# Patient Record
Sex: FEMALE | Race: WHITE | NOT HISPANIC OR LATINO | Employment: PART TIME | URBAN - METROPOLITAN AREA
[De-identification: names, ages, dates, MRNs, and addresses within clinical notes are randomized per-mention and may not be internally consistent; named-entity substitution may affect disease eponyms.]

---

## 2024-01-24 NOTE — PROGRESS NOTES
Lashell Bales female   1970 53 y.o.   [id number]      Chief Complaint  High risk surveillance care, annual mammogram and exam    History Of Present Illness  Lashell Bales is a very pleasant 53 year old  woman [facility] and exam for high risk surveillance care. She has a history of left breast cyst aspiration in 2021. 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 She has family history of breast cancer in four maternal aunts and four maternal cousins, one cousin to her knowledge is BRCA positive. All were diagnosed between ages 30s-50s. She denies breast surgery or biopsy.      Lashell reports a personal history of intracranial hypertension in  that resulted in two shunt placements. However, she states her body rejected the shunt and she no longer requires follow up with the neurologist.      She presents today for annual mammogram and exam. She denies any new masses or lumps. She started Tamoxifen 10mg daily for three years in 2023. She took for 3-4 months and discontinued due to GI issues. She had COVID twice and the GI issues did not resolve following discontinuance. She is following up with a GI specialist and would like to restart Tamoxifen.      BREAST IMAGIN2023 Bilateral screening mammogram, indicates BI-RADS Category 2. 2022 FAST breast MRI, indicates BI-RADS Category 2.     FEMALE HISTORY: menarche age 16, , first birth age 29,  x8 months, no OCP's, perimenopausal, LMP 2024, reports regular cycles, reports mild night sweats, heterogeneously dense tissue     FAMILY CANCER HISTORY:  Maternal Aunt (1): Breast cancer/Lung cancer  Maternal Aunt (2): Breast cancer  Maternal Aunt (3): Breast cancer  Maternal Aunt (4): Breast cancer  Maternal Cousin (1): Breast cancer, BRCA positive  Maternal  Cousin (2): Breast cancer  Maternal Cousin (3): Breast cancer  Maternal Cousin (4): Breast cancer  Paternal Cousin: Cervical cancer      Surgical History  She has a past surgical history that includes  section, classic (2015); Hand surgery (2015); Other surgical history (2020); Other surgical history (2020); Other surgical history (2016); Other surgical history (2016); Dilation and curettage of uterus (2020); MR angio head wo IV contrast (2015); and MR head angio w IV contrast (2016).     Social History  She reports that she has never smoked. She has never used smokeless tobacco. No history on file for alcohol use and drug use.    Family History  Family History   Problem Relation Name Age of Onset    Breast cancer Mother's Sister      Breast cancer Father's Sister          Allergies  Sulfa (sulfonamide antibiotics) and Morphine    Medications  No current outpatient medications      REVIEW OF SYSTEMS    Constitutional:  Negative for appetite change, fatigue, fever and unexpected weight change.   HENT:  Negative for ear pain, hearing loss, nosebleeds, sore throat and trouble swallowing.    Eyes:  Negative for discharge, itching and visual disturbance.   Respiratory:  Negative for cough, chest tightness and shortness of breath.    Cardiovascular:  Negative for chest pain, palpitations and leg swelling.   Breast: as indicated in HPI  Gastrointestinal:  Negative for abdominal pain, constipation, diarrhea and nausea.   Endocrine: Negative for cold intolerance and heat intolerance.   Genitourinary:  Negative for dysuria, frequency, hematuria, pelvic pain and vaginal bleeding.   Musculoskeletal:  Negative for arthralgias, back pain, gait problem, joint swelling and myalgias.   Skin:  Negative for color change and rash.   Allergic/Immunologic: Negative for environmental allergies and food allergies.   Neurological:  Negative for dizziness, tremors, speech difficulty,  weakness, numbness and headaches.   Hematological:  Does not bruise/bleed easily.   Psychiatric/Behavioral:  Negative for agitation, dysphoric mood and sleep disturbance. The patient is not nervous/anxious.         Past Medical History  She has a past medical history of Personal history of other infectious and parasitic diseases (01/28/2016).     Physical Exam  Patient is alert and oriented x3 and in a relaxed and appropriate mood. Her gait is steady and hand grasps are equal. Sclera is clear. The breasts are nearly symmetrical. The tissue is very dense and grainy in the superior lateral quadrants and softer below without palpable abnormalities, discrete nodules or masses. There is a well-healed skin excision upper chest wall. The skin and nipples appear normal. There is no cervical, supraclavicular or axillary lymphadenopathy. Heart rate and rhythm normal, S1 and S2 appreciated. The lungs are clear to auscultation bilaterally. Abdomen is soft and non-tender.       Physical Exam     Last Recorded Vitals  Vitals:    02/01/24 0754   BP: 142/89   Pulse: 67       Relevant Results   Time was spent viewing digital images of the radiology testing with the patient. I explained the results in depth, along with suggested explanation for follow up recommendations based on the testing results. BI-RADS Category 2    Imaging  Narrative & Impression   Interpreted By:  Melissa Rangel,   STUDY:  BI MAMMO BILATERAL SCREENING TOMOSYNTHESIS;  2/1/2024 7:57 am      ACCESSION NUMBER(S):  [id number]      ORDERING CLINICIAN:  RENNY GUERIN      INDICATION:  Screening.      COMPARISON:  All prior mammograms that are available at the time of interpretation.      FINDINGS:  2D and tomosynthesis images were reviewed at 1 mm slice thickness.      Density:  The breast tissue is heterogeneously dense, which may  obscure small masses.      There are bilateral benign circumscribed masses fluctuating in size  and number over time,  consistent with a changing cystic pattern. No  suspicious masses or calcifications are identified.      IMPRESSION:  No mammographic evidence of malignancy.      BI-RADS CATEGORY:  BI-RADS Category:  2 Benign.  Recommendation:  Routine Screening Mammogram in 1 Year.  Recommended Date:  1 Year.  Laterality:  Bilateral.       Assessment/Plan   High risk surveillance care, normal clinical exam and imaging, family history of breast cancer, family history of BRCA gene, BRCA negative, history of left cyst aspiration, extremely dense tissue     Plan: Return in one year for bilateral screening mammogram and office visit. Restart Tamoxifen 10mg daily. Refills sent x 1 year. Full MRI in July/August 2024.     Patient Discussion/Summary  Your clinical examination and imaging are normal. Please return in one year for bilateral screening mammogram and office visit or sooner if you have any problems or concerns.    High risk breast surveillance care plan:  Yearly mammogram with digital breast tomosynthesis  Twice yearly clinical breast examinations  Breast MRI - Full MRI in July/August 2024  Monthly self breast examinations  Vitamin D3 2000 IU/daily (over the counter)  Exercise 3-4 times per week for 45-60 minutes  Limit alcohol to 3-4 drinks per week   Eat a healthy low-fat diet with lots of fruits and vegetables  Risk models indicate personal risk of breast cancer in the next five years and lifetime (age 90)  Breast Cancer Risk Assessment Tool (Yodit): 5 year risk 1.1% (average 1.4%) and lifetime risk 8.8% (average 10.8%)  Alida: 5 year risk 2.8% (average 1.3%) and lifetime risk 18.4% (average 9%)   Restart Tamoxifen 10mg daily.    You can see your health information, review clinical summaries from office visits & test results online when you follow your health with MY  Chart, a personal health record. To sign up go to [email or web address]. If you need assistance with signing up or trouble getting into your  account call [facility] Patient Line 24/7 at [phone].    My office phone number is [phone] if you need to get in touch with me or have additional questions or concerns. Thank you for choosing [facility] and trusting me as your healthcare provider. I look forward to seeing you again at your next office visit. I am honored to be a provider on your health care team and I remain dedicated to helping you achieve your health goals.      Zaria Lima, APRN-CNP

## 2024-02-01 ENCOUNTER — HOSPITAL ENCOUNTER (OUTPATIENT)
Dept: RADIOLOGY | Facility: CLINIC | Age: 54
Discharge: HOME | End: 2024-02-01
Payer: COMMERCIAL

## 2024-02-01 ENCOUNTER — OFFICE VISIT (OUTPATIENT)
Dept: SURGICAL ONCOLOGY | Facility: CLINIC | Age: 54
End: 2024-02-01
Payer: COMMERCIAL

## 2024-02-01 VITALS
BODY MASS INDEX: 29.62 KG/M2 | SYSTOLIC BLOOD PRESSURE: 142 MMHG | WEIGHT: 154.2 LBS | DIASTOLIC BLOOD PRESSURE: 89 MMHG | HEART RATE: 67 BPM

## 2024-02-01 DIAGNOSIS — Z12.39 BREAST CANCER SCREENING, HIGH RISK PATIENT: Primary | ICD-10-CM

## 2024-02-01 DIAGNOSIS — Z12.31 ENCOUNTER FOR SCREENING MAMMOGRAM FOR MALIGNANT NEOPLASM OF BREAST: ICD-10-CM

## 2024-02-01 DIAGNOSIS — Z79.810 USE OF TAMOXIFEN (NOLVADEX): ICD-10-CM

## 2024-02-01 DIAGNOSIS — R92.30 DENSE BREAST TISSUE: ICD-10-CM

## 2024-02-01 PROCEDURE — 99214 OFFICE O/P EST MOD 30 MIN: CPT | Performed by: NURSE PRACTITIONER

## 2024-02-01 PROCEDURE — 77063 BREAST TOMOSYNTHESIS BI: CPT

## 2024-02-01 PROCEDURE — 77063 BREAST TOMOSYNTHESIS BI: CPT | Mod: BILATERAL PROCEDURE | Performed by: RADIOLOGY

## 2024-02-01 PROCEDURE — 77067 SCR MAMMO BI INCL CAD: CPT | Mod: BILATERAL PROCEDURE | Performed by: RADIOLOGY

## 2024-02-01 RX ORDER — TAMOXIFEN CITRATE 10 MG/1
10 TABLET ORAL DAILY
Qty: 90 TABLET | Refills: 3 | Status: SHIPPED | OUTPATIENT
Start: 2024-02-01 | End: 2025-01-31

## 2024-02-01 ASSESSMENT — PAIN SCALES - GENERAL: PAINLEVEL: 0-NO PAIN

## 2024-02-01 NOTE — PATIENT INSTRUCTIONS
Your clinical examination and imaging are normal. Please return in one year for bilateral screening mammogram and office visit or sooner if you have any problems or concerns.    High risk breast surveillance care plan:  Yearly mammogram with digital breast tomosynthesis  Twice yearly clinical breast examinations  Breast MRI - Full MRI in July/August 2024  Monthly self breast examinations  Vitamin D3 2000 IU/daily (over the counter)  Exercise 3-4 times per week for 45-60 minutes  Limit alcohol to 3-4 drinks per week   Eat a healthy low-fat diet with lots of fruits and vegetables  Risk models indicate personal risk of breast cancer in the next five years and lifetime (age 90)  Breast Cancer Risk Assessment Tool (Yodit): 5 year risk 1.1% (average 1.4%) and lifetime risk 8.8% (average 10.8%)  Alida: 5 year risk 2.8% (average 1.3%) and lifetime risk 18.4% (average 9%)   Restart Tamoxifen 10mg daily.    You can see your health information, review clinical summaries from office visits & test results online when you follow your health with MY  Chart, a personal health record. To sign up go to [email or web address]. If you need assistance with signing up or trouble getting into your account call [employer] Patient Line 24/7 at [phone].    My office phone number is [phone] if you need to get in touch with me or have additional questions or concerns. Thank you for choosing [facility] and trusting me as your healthcare provider. I look forward to seeing you again at your next office visit. I am honored to be a provider on your health care team and I remain dedicated to helping you achieve your health goals.

## 2024-06-03 ENCOUNTER — PATIENT MESSAGE (OUTPATIENT)
Dept: SURGICAL ONCOLOGY | Facility: CLINIC | Age: 54
End: 2024-06-03
Payer: COMMERCIAL

## 2024-06-04 NOTE — TELEPHONE ENCOUNTER
From: Lashell Bales  To: Zaria Lima  Sent: 6/3/2024 4:48 PM EDT  Subject: Lump in left breast    Good afternoon. I have a lump in my left breast that has grown pretty quickly in just a few weeks. It feels like the one I had drained last time. I didn’t know if I should make an appointment with you or the physician that drained the last one.

## 2024-06-05 NOTE — PROGRESS NOTES
Lashell Bales female   1970 53 y.o.   [id number]      Chief Complaint  High risk surveillance care, left breast mass    History Of Present Illness  Lashell Bales is a very pleasant 53 year old  woman [facility] and exam for high risk surveillance care. She has a history of left breast cyst aspiration in 2021. 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 She has family history of breast cancer in four maternal aunts and four maternal cousins, one cousin to her knowledge is BRCA positive. All were diagnosed between ages 30s-50s. She denies breast surgery or biopsy.      Lashell reports a personal history of intracranial hypertension in  that resulted in two shunt placements. However, she states her body rejected the shunt and she no longer requires follow up with the neurologist.     She started Tamoxifen 10mg daily for three years in 2023. She took for 3-4 months and discontinued due to GI issues. She had COVID twice and the GI issues did not resolve following discontinuance. She restarted Tamoxifen in 2024, currently taking and tolerating well.      She presents today for left breast mass. She first noticed the mass about one month ago. She states it is a little bothersome and has increased in size since first noticing it. She states it fluctuates with her cycle.      BREAST IMAGIN2023 Bilateral screening mammogram, indicates BI-RADS Category 2. 2022 FAST breast MRI, indicates BI-RADS Category 2.     FEMALE HISTORY: menarche age 16, , first birth age 29,  x8 months, no OCP's, perimenopausal, LMP 2024, reports regular cycles, reports mild night sweats, heterogeneously dense tissue     FAMILY CANCER HISTORY:  Maternal Aunt (1): Breast cancer/Lung cancer  Maternal Aunt (2): Breast  cancer  Maternal Aunt (3): Breast cancer  Maternal Aunt (4): Breast cancer  Maternal Cousin (1): Breast cancer, BRCA positive  Maternal Cousin (2): Breast cancer  Maternal Cousin (3): Breast cancer  Maternal Cousin (4): Breast cancer  Paternal Cousin: Cervical cancer      Surgical History  She has a past surgical history that includes  section, classic (2015); Hand surgery (2015); Other surgical history (2020); Other surgical history (2020); Other surgical history (2016); Other surgical history (2016); Dilation and curettage of uterus (2020); MR angio head wo IV contrast (2015); MR head angio w IV contrast (2016); US breast core biopsy (Left); and BI US guided cyst aspiration (Left).     Social History  She reports that she has never smoked. She has never used smokeless tobacco. No history on file for alcohol use and drug use.    Family History  Family History   Problem Relation Name Age of Onset    Breast cancer Mother's Sister      Breast cancer Father's Sister      Breast cancer Cousin          Allergies  Sulfa (sulfonamide antibiotics) and Morphine    Medications  Current Outpatient Medications   Medication Instructions    tamoxifen (NOLVADEX) 10 mg, oral, Daily, Take with water or any other nonalcoholic drink with or without food at around the same time(s) every day.         REVIEW OF SYSTEMS    Constitutional:  Negative for appetite change, fatigue, fever and unexpected weight change.   HENT:  Negative for ear pain, hearing loss, nosebleeds, sore throat and trouble swallowing.    Eyes:  Negative for discharge, itching and visual disturbance.   Respiratory:  Negative for cough, chest tightness and shortness of breath.    Cardiovascular:  Negative for chest pain, palpitations and leg swelling.   Breast: as indicated in HPI  Gastrointestinal:  Negative for abdominal pain, constipation, diarrhea and nausea.   Endocrine: Negative for cold intolerance and  heat intolerance.   Genitourinary:  Negative for dysuria, frequency, hematuria, pelvic pain and vaginal bleeding.   Musculoskeletal:  Negative for arthralgias, back pain, gait problem, joint swelling and myalgias.   Skin:  Negative for color change and rash.   Allergic/Immunologic: Negative for environmental allergies and food allergies.   Neurological:  Negative for dizziness, tremors, speech difficulty, weakness, numbness and headaches.   Hematological:  Does not bruise/bleed easily.   Psychiatric/Behavioral:  Negative for agitation, dysphoric mood and sleep disturbance. The patient is not nervous/anxious.         Past Medical History  She has a past medical history of Personal history of other infectious and parasitic diseases (01/28/2016).     Physical Exam  Patient is alert and oriented x3 and in a relaxed and appropriate mood. Her gait is steady and hand grasps are equal. Sclera is clear. The breasts are nearly symmetrical. The tissue is very dense and grainy in the superior lateral quadrants and softer below without palpable abnormalities, discrete nodules or masses. The left breast, 2:30, 9 cm from the nipple, 3 x 3 cm soft, round and mobile cyst. There is a well-healed skin excision upper chest wall. The skin and nipples appear normal. There is no cervical, supraclavicular or axillary lymphadenopathy. Heart rate and rhythm normal, S1 and S2 appreciated. The lungs are clear to auscultation bilaterally. Abdomen is soft and non-tender.         Incision & Drainage    Date/Time: 6/19/2024 10:37 AM    Performed by: GLYNN Barbosa  Authorized by: GLYNN Barbosa    Consent:     Consent obtained:  Verbal    Consent given by:  Patient    Risks, benefits, and alternatives were discussed: yes      Risks discussed:  Bleeding and infection    Alternatives discussed:  No treatment  Universal protocol:     Procedure explained and questions answered to patient or proxy's satisfaction: yes       Relevant documents present and verified: yes      Test results available : yes      Imaging studies available: yes      Required blood products, implants, devices, and special equipment available: no      Site/side marked: yes      Immediately prior to procedure, a time out was called: yes      Patient identity confirmed:  Verbally with patient and hospital-assigned identification number  Location:     Type:  Cyst    Size:  3 x 3 cm    Location:  Trunk    Trunk location:  L breast  Pre-procedure details:     Skin preparation:  Chlorhexidine  Sedation:     Sedation type:  None  Anesthesia:     Anesthesia method:  Local infiltration    Local anesthetic:  Lidocaine 1% w/o epi  Procedure type:     Complexity:  Simple  Procedure details:     Ultrasound guidance: no      Needle aspiration: yes      Needle size:  18 G    Incision types:  Single straight    Incision depth:  Subcutaneous    Drainage:  Serosanguinous    Drainage amount:  Moderate    Packing materials:  None  Post-procedure details:     Procedure completion:  Tolerated        Physical Exam  Chest:              Last Recorded Vitals  Vitals:    06/19/24 0804   BP: 118/82   Pulse: 70   Resp: 16         Relevant Results   Time was spent discussing digital images of the radiology testing with the patient. I explained the results in depth, along with suggested explanation for follow up recommendations based on the testing results. BI-RADS Category 2    Imaging    Narrative & Impression   Interpreted By:  Torito Hernandez and Avery Ross   STUDY:  BI US BREAST LIMITED LEFT;  6/19/2024 8:44 am      ACCESSION NUMBER(S):  [id number]      ORDERING CLINICIAN:  RENNY GUERIN      INDICATION:  53-year-old female with the a tender palpable left breast lump for 2  weeks.      COMPARISON:  02/01/2024, 01/30/2023, 01/26/2022.      FINDINGS:  A targeted ultrasound in the area of the palpable left breast lump  was performed by a registered sonographer using elastography.       An oval circumscribed parallel anechoic cyst with posterior  enhancement is seen in the area of the patient's palpable lump at the  2:30 o'clock position 9 cm from the nipple. The cyst measures 2.8 x  2.1 x 2.8 cm. It is avascular and soft on elastography.      IMPRESSION:  A benign simple cyst corresponds with the patient's palpable left  breast lump. Clinical follow-up and continued annual screening with a  bilateral mammogram in February 2025 is recommended.      BI-RADS CATEGORY:      BI-RADS Category:  2 Benign.  Recommendation:  Clinical Follow-up and Continued Annual Screening.  Recommended Date:  8 Months.  Laterality:  Bilateral.     Narrative & Impression   Interpreted By:  Melissa Rangel,   STUDY:  BI MAMMO BILATERAL SCREENING TOMOSYNTHESIS;  2/1/2024 7:57 am      ACCESSION NUMBER(S):  [id number]      ORDERING CLINICIAN:  RENNY GUERIN      INDICATION:  Screening.      COMPARISON:  All prior mammograms that are available at the time of interpretation.      FINDINGS:  2D and tomosynthesis images were reviewed at 1 mm slice thickness.      Density:  The breast tissue is heterogeneously dense, which may  obscure small masses.      There are bilateral benign circumscribed masses fluctuating in size  and number over time, consistent with a changing cystic pattern. No  suspicious masses or calcifications are identified.      IMPRESSION:  No mammographic evidence of malignancy.      BI-RADS CATEGORY:  BI-RADS Category:  2 Benign.  Recommendation:  Routine Screening Mammogram in 1 Year.  Recommended Date:  1 Year.  Laterality:  Bilateral.       Assessment/Plan   High risk surveillance care, normal clinical exam and imaging, left breast benign cyst, family history of breast cancer, family history of BRCA gene, BRCA negative, extremely dense tissue     Plan: Left breast cyst aspirated. Return in February 2025 for bilateral screening mammogram and office visit. Continue Tamoxifen 10mg daily. Full MRI in  August 2024 as scheduled.     Patient Discussion/Summary  Your clinical examination and imaging are normal. You have a left breast benign cyst that we aspirated. Please return in February 2025 for bilateral screening mammogram and office visit or sooner if you have any problems or concerns. Continue Tamoxifen 10mg daily.    You can see your health information, review clinical summaries from office visits & test results online when you follow your health with MY  Chart, a personal health record. To sign up go to [email or web address]. If you need assistance with signing up or trouble getting into your account call [employer] Patient Line 24/7 at [phone].    My office phone number is [phone] if you need to get in touch with me or have additional questions or concerns. Thank you for choosing [facility] and trusting me as your healthcare provider. I look forward to seeing you again at your next office visit. I am honored to be a provider on your health care team and I remain dedicated to helping you achieve your health goals.      Zaria Lima, APRN-CNP

## 2024-06-18 ENCOUNTER — APPOINTMENT (OUTPATIENT)
Dept: RADIOLOGY | Facility: HOSPITAL | Age: 54
End: 2024-06-18
Payer: COMMERCIAL

## 2024-06-18 ENCOUNTER — APPOINTMENT (OUTPATIENT)
Dept: SURGICAL ONCOLOGY | Facility: HOSPITAL | Age: 54
End: 2024-06-18
Payer: COMMERCIAL

## 2024-06-19 ENCOUNTER — OFFICE VISIT (OUTPATIENT)
Dept: SURGICAL ONCOLOGY | Facility: HOSPITAL | Age: 54
End: 2024-06-19
Payer: COMMERCIAL

## 2024-06-19 ENCOUNTER — HOSPITAL ENCOUNTER (OUTPATIENT)
Dept: RADIOLOGY | Facility: HOSPITAL | Age: 54
Discharge: HOME | End: 2024-06-19
Payer: COMMERCIAL

## 2024-06-19 ENCOUNTER — APPOINTMENT (OUTPATIENT)
Dept: SURGICAL ONCOLOGY | Facility: HOSPITAL | Age: 54
End: 2024-06-19
Payer: COMMERCIAL

## 2024-06-19 VITALS
BODY MASS INDEX: 27.6 KG/M2 | RESPIRATION RATE: 16 BRPM | HEIGHT: 62 IN | HEART RATE: 70 BPM | WEIGHT: 150 LBS | SYSTOLIC BLOOD PRESSURE: 118 MMHG | DIASTOLIC BLOOD PRESSURE: 82 MMHG

## 2024-06-19 DIAGNOSIS — N63.0 LUMP IN FEMALE BREAST: ICD-10-CM

## 2024-06-19 DIAGNOSIS — N60.02 BREAST CYST, LEFT: Primary | ICD-10-CM

## 2024-06-19 PROCEDURE — 10060 I&D ABSCESS SIMPLE/SINGLE: CPT | Performed by: NURSE PRACTITIONER

## 2024-06-19 PROCEDURE — 76642 ULTRASOUND BREAST LIMITED: CPT | Mod: LEFT SIDE | Performed by: RADIOLOGY

## 2024-06-19 PROCEDURE — 76982 USE 1ST TARGET LESION: CPT | Mod: LT

## 2024-06-19 PROCEDURE — 76642 ULTRASOUND BREAST LIMITED: CPT | Mod: LT

## 2024-06-19 PROCEDURE — 99213 OFFICE O/P EST LOW 20 MIN: CPT | Mod: 25 | Performed by: NURSE PRACTITIONER

## 2024-06-19 NOTE — PATIENT INSTRUCTIONS
Your clinical examination and imaging are normal. You have a left breast benign cyst that we aspirated. Please return in February 2025 for bilateral screening mammogram and office visit or sooner if you have any problems or concerns. Continue Tamoxifen 10mg daily.    You can see your health information, review clinical summaries from office visits & test results online when you follow your health with MY  Chart, a personal health record. To sign up go to [email or web address]. If you need assistance with signing up or trouble getting into your account call [employer] Patient Line 24/7 at [phone].    My office phone number is [phone] if you need to get in touch with me or have additional questions or concerns. Thank you for choosing [facility] and trusting me as your healthcare provider. I look forward to seeing you again at your next office visit. I am honored to be a provider on your health care team and I remain dedicated to helping you achieve your health goals.

## 2024-08-08 ENCOUNTER — HOSPITAL ENCOUNTER (OUTPATIENT)
Dept: RADIOLOGY | Facility: HOSPITAL | Age: 54
Discharge: HOME | End: 2024-08-08
Payer: COMMERCIAL

## 2024-08-08 DIAGNOSIS — R92.30 DENSE BREAST TISSUE: ICD-10-CM

## 2024-08-08 DIAGNOSIS — Z12.39 BREAST CANCER SCREENING, HIGH RISK PATIENT: ICD-10-CM

## 2024-08-08 PROCEDURE — 2500000004 HC RX 250 GENERAL PHARMACY W/ HCPCS (ALT 636 FOR OP/ED): Performed by: NURSE PRACTITIONER

## 2024-08-08 PROCEDURE — 77049 MRI BREAST C-+ W/CAD BI: CPT

## 2024-08-08 PROCEDURE — A9575 INJ GADOTERATE MEGLUMI 0.1ML: HCPCS | Performed by: NURSE PRACTITIONER

## 2024-08-08 PROCEDURE — 77049 MRI BREAST C-+ W/CAD BI: CPT | Performed by: STUDENT IN AN ORGANIZED HEALTH CARE EDUCATION/TRAINING PROGRAM

## 2024-08-08 RX ORDER — GADOTERATE MEGLUMINE 376.9 MG/ML
14 INJECTION INTRAVENOUS
Status: COMPLETED | OUTPATIENT
Start: 2024-08-08 | End: 2024-08-08

## 2024-08-09 ENCOUNTER — APPOINTMENT (OUTPATIENT)
Dept: RADIOLOGY | Facility: HOSPITAL | Age: 54
End: 2024-08-09
Payer: COMMERCIAL

## 2024-08-13 NOTE — PROGRESS NOTES
Lashell Bales female   1970 53 y.o.   [id number]      Chief Complaint  Biopsy consultation    History Of Present Illness  Lashell Bales is a very pleasant 53 year old  woman [facility]. She has a history of left breast cyst aspirations. She has a history of left breast core biopsy with [facility], benign. 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 She has family history of breast cancer in four maternal aunts and four maternal cousins, one cousin to her knowledge is BRCA positive. All were diagnosed between ages 30s-50s. She denies breast surgery.     Lashell reports a personal history of intracranial hypertension in  that resulted in two shunt placements. However, she states her body rejected the shunt and she no longer requires follow up with the neurologist.     She started Tamoxifen 10mg daily for three years in 2023. She took for 3-4 months and discontinued due to GI issues. She had COVID twice and the GI issues did not resolve following discontinuance. She restarted Tamoxifen in 2024, currently taking and tolerating well.      She presents today for abnormal MRI and biopsy consultation.      BREAST IMAGIN2024 Bilateral Full breast MRI, indicates BI-RADS Category 4. Left breast, new enhancing mass warranting MRI guided biopsy. 2024 Bilateral screening mammogram, indicates BI-RADS Category 2.     FEMALE HISTORY: menarche age 16, , first birth age 29,  x8 months, no OCP's, perimenopausal, LMP 2024, reports regular cycles, reports mild night sweats, heterogeneously dense tissue     FAMILY CANCER HISTORY:  Maternal Aunt (1): Breast cancer/Lung cancer  Maternal Aunt (2): Breast cancer  Maternal Aunt (3): Breast cancer  Maternal Aunt (4): Breast cancer  Maternal Cousin (1): Breast cancer, BRCA  positive  Maternal Cousin (2): Breast cancer  Maternal Cousin (3): Breast cancer  Maternal Cousin (4): Breast cancer  Paternal Cousin: Cervical cancer      Surgical History  She has a past surgical history that includes  section, classic (2015); Hand surgery (2015); Other surgical history (2020); Other surgical history (2020); Other surgical history (2016); Other surgical history (2016); Dilation and curettage of uterus (2020); MR angio head wo IV contrast (2015); MR head angio w IV contrast (2016); US breast core biopsy (Left); and BI US guided cyst aspiration (Left).     Social History  She reports that she has never smoked. She has never used smokeless tobacco. No history on file for alcohol use and drug use.    Family History  Family History   Problem Relation Name Age of Onset    Breast cancer Mother's Sister      Breast cancer Father's Sister      Breast cancer Cousin          Allergies  Sulfa (sulfonamide antibiotics) and Morphine    Medications  Current Outpatient Medications   Medication Instructions    tamoxifen (NOLVADEX) 10 mg, oral, Daily, Take with water or any other nonalcoholic drink with or without food at around the same time(s) every day.         REVIEW OF SYSTEMS    Constitutional:  Negative for appetite change, fatigue, fever and unexpected weight change.   HENT:  Negative for ear pain, hearing loss, nosebleeds, sore throat and trouble swallowing.    Eyes:  Negative for discharge, itching and visual disturbance.   Respiratory:  Negative for cough, chest tightness and shortness of breath.    Cardiovascular:  Negative for chest pain, palpitations and leg swelling.   Breast: as indicated in HPI  Gastrointestinal:  Negative for abdominal pain, constipation, diarrhea and nausea.   Endocrine: Negative for cold intolerance and heat intolerance.   Genitourinary:  Negative for dysuria, frequency, hematuria, pelvic pain and vaginal bleeding.    Musculoskeletal:  Negative for arthralgias, back pain, gait problem, joint swelling and myalgias.   Skin:  Negative for color change and rash.   Allergic/Immunologic: Negative for environmental allergies and food allergies.   Neurological:  Negative for dizziness, tremors, speech difficulty, weakness, numbness and headaches.   Hematological:  Does not bruise/bleed easily.   Psychiatric/Behavioral:  Negative for agitation, dysphoric mood and sleep disturbance. The patient is not nervous/anxious.         Past Medical History  She has a past medical history of Personal history of other infectious and parasitic diseases (01/28/2016).     Physical Exam  Patient is alert and oriented x3 and in a relaxed and appropriate mood. Her gait is steady and hand grasps are equal. Sclera is clear. The breasts are nearly symmetrical. The tissue is very dense and grainy in the superior lateral quadrants and softer below without palpable abnormalities, discrete nodules or masses. The left breast has multiple palpable cysts. There is a well-healed skin excision upper chest wall. The skin and nipples appear normal. There is no cervical, supraclavicular or axillary lymphadenopathy. Heart rate and rhythm normal, S1 and S2 appreciated. The lungs are clear to auscultation bilaterally. Abdomen is soft and non-tender.       Physical Exam  Chest:              Last Recorded Vitals  Vitals:    08/21/24 0752   BP: 140/80   Pulse: 83   Resp: 16       Relevant Results   Time was spent discussing digital images of the radiology testing with the patient. I explained the results in depth, along with suggested explanation for follow up recommendations based on the testing results. BI-RADS Category 4    Imaging    Narrative & Impression   Interpreted By:  Neymar Dent,   STUDY:  BI MR BREAST BILATERAL WITH CONTRAST FULL PROTOCOL;  8/8/2024 7:42 am      ACCESSION NUMBER(S):  [id number]      ORDERING CLINICIAN:  RENNY GUERIN      INDICATION:  Dense  breast tissue. History of benign left breast biopsy. Family  history of breast cancer..      COMPARISON:  Ultrasound from 06/19/2024, mammograms from 02/01/2024, 01/30/2023,  01/26/2022, MRI from 07/27/2022      TECHNIQUE:  Using a dedicated breast coil, STIR axial and T1-weighted fat  saturation axial images of the breasts were obtained, the latter both  before and after intravenous administration of Gadolinium DTPA. On an  independent workstation, 3-D images were formulated using [employer]  including time enhancement curves, subtraction images and MIP images.      Intravenous contrast: 14 ML of Dotarem      FINDINGS:  Density: Heterogeneous fibroglandular tissue.      There is symmetric moderate bilateral background enhancement.      There are T2 benign bright rim enhancing cysts throughout the  bilateral breasts.      RIGHT BREAST:  No suspicious mass or nonmass enhancement is  identified.      No axillary or internal mammary lymphadenopathy is appreciated.      LEFT BREAST: In the upper outer left breast at middle depth, there is  a new heterogeneously enhancing round circumscribed mass with  progressive kinetics measuring 10 x 8 x 9 mm on series 100, image  131. This does not demonstrate T2 hyperintensity. No other suspicious  mass or nonmass enhancement is identified.      No axillary or internal mammary lymphadenopathy is appreciated.      NON-BREAST FINDINGS:  T2 hyperintense nonenhancing hepatic cyst in  the left lobe of the liver measuring up to 2.2 cm on series 5, image  46.      IMPRESSION:  New enhancing left breast mass. Further evaluation with surgical  consultation and MRI guided biopsy is recommended. A message was sent  to the referring practitioner at the time of this dictation regarding  these findings using the epic critical findings reporting system. A  pre-procedure form was filled out.      No MRI evidence of malignancy in the right breast.      Method of Detection: Category Smri - Screening  MRI      BI-RADS CATEGORY:  BI-RADS Category:  4 Suspicious.  Recommendation:  Surgical Consultation and Biopsy.  Recommended Date:  Immediate.  Laterality:  Left.       Narrative & Impression   Interpreted By:  Torito Hernandez and Avery Ross   STUDY:  BI US BREAST LIMITED LEFT;  6/19/2024 8:44 am      ACCESSION NUMBER(S):  [id number]      ORDERING CLINICIAN:  RENNY GUERIN      INDICATION:  53-year-old female with the a tender palpable left breast lump for 2  weeks.      COMPARISON:  02/01/2024, 01/30/2023, 01/26/2022.      FINDINGS:  A targeted ultrasound in the area of the palpable left breast lump  was performed by a registered sonographer using elastography.      An oval circumscribed parallel anechoic cyst with posterior  enhancement is seen in the area of the patient's palpable lump at the  2:30 o'clock position 9 cm from the nipple. The cyst measures 2.8 x  2.1 x 2.8 cm. It is avascular and soft on elastography.      IMPRESSION:  A benign simple cyst corresponds with the patient's palpable left  breast lump. Clinical follow-up and continued annual screening with a  bilateral mammogram in February 2025 is recommended.      BI-RADS CATEGORY:      BI-RADS Category:  2 Benign.  Recommendation:  Clinical Follow-up and Continued Annual Screening.  Recommended Date:  8 Months.  Laterality:  Bilateral.     Narrative & Impression   Interpreted By:  Melissa Rangel,   STUDY:  BI MAMMO BILATERAL SCREENING TOMOSYNTHESIS;  2/1/2024 7:57 am      ACCESSION NUMBER(S):  [id number]      ORDERING CLINICIAN:  RENNY GUERIN      INDICATION:  Screening.      COMPARISON:  All prior mammograms that are available at the time of interpretation.      FINDINGS:  2D and tomosynthesis images were reviewed at 1 mm slice thickness.      Density:  The breast tissue is heterogeneously dense, which may  obscure small masses.      There are bilateral benign circumscribed masses fluctuating in size  and number over time, consistent  with a changing cystic pattern. No  suspicious masses or calcifications are identified.      IMPRESSION:  No mammographic evidence of malignancy.      BI-RADS CATEGORY:  BI-RADS Category:  2 Benign.  Recommendation:  Routine Screening Mammogram in 1 Year.  Recommended Date:  1 Year.  Laterality:  Bilateral.       Assessment/Plan   High risk surveillance care, normal clinical exam, abnormal MRI, left breast mass, family history of breast cancer, family history of BRCA gene, BRCA negative, extremely dense tissue     Plan: Left breast MRI guided biopsy    Patient Discussion/Summary  I recommend a left breast MRI guided biopsy. A breast radiology physician will perform the procedure. Possible diagnoses include benign, atypia or cancer. Bruising and mild discomfort after the biopsy is normal and will improve. I typically have results in 3-5 business days. I will call you with the results, please have your phone handy to take my call. If you receive medical information from [facility] Personal Health Record, it is possible to view or see results of your biopsy or procedure before I contact you directly. I will provide recommendations for future follow up based on your biopsy results.     You can see your health information, review clinical summaries from office visits & test results online when you follow your health with MY  Chart, a personal health record. To sign up go to [email or web address]. If you need assistance with signing up or trouble getting into your account call [facility] Patient Line 24/7 at [phone].    Should you have any questions or concerns after biopsy, please do not hesitate to call my office at [phone]. If it has been more than a week since your biopsy was performed and you have not received results, please call my office at [phone]. Thank you for choosing [facility] and trusting me as your healthcare provider. I am honored to be a provider on your health care team and I  remain dedicated to helping you achieve your health goals.      Zaria Lima, APRN-CNP

## 2024-08-21 ENCOUNTER — HOSPITAL ENCOUNTER (OUTPATIENT)
Dept: RADIOLOGY | Facility: HOSPITAL | Age: 54
Discharge: HOME | End: 2024-08-21
Payer: COMMERCIAL

## 2024-08-21 ENCOUNTER — OFFICE VISIT (OUTPATIENT)
Dept: SURGICAL ONCOLOGY | Facility: HOSPITAL | Age: 54
End: 2024-08-21
Payer: COMMERCIAL

## 2024-08-21 VITALS
HEART RATE: 83 BPM | SYSTOLIC BLOOD PRESSURE: 140 MMHG | DIASTOLIC BLOOD PRESSURE: 80 MMHG | HEIGHT: 61 IN | WEIGHT: 150 LBS | BODY MASS INDEX: 28.32 KG/M2 | RESPIRATION RATE: 16 BRPM

## 2024-08-21 DIAGNOSIS — R92.8 ABNORMAL MAGNETIC RESONANCE IMAGING OF BREAST: ICD-10-CM

## 2024-08-21 DIAGNOSIS — N63.21 MASS OF UPPER OUTER QUADRANT OF LEFT BREAST: ICD-10-CM

## 2024-08-21 DIAGNOSIS — R92.8 ABNORMAL MRI, BREAST: Primary | ICD-10-CM

## 2024-08-21 DIAGNOSIS — N60.02 BREAST CYST, LEFT: ICD-10-CM

## 2024-08-21 PROCEDURE — 1036F TOBACCO NON-USER: CPT | Performed by: NURSE PRACTITIONER

## 2024-08-21 PROCEDURE — 99214 OFFICE O/P EST MOD 30 MIN: CPT | Performed by: NURSE PRACTITIONER

## 2024-08-21 PROCEDURE — A9575 INJ GADOTERATE MEGLUMI 0.1ML: HCPCS | Performed by: NURSE PRACTITIONER

## 2024-08-21 PROCEDURE — 2550000001 HC RX 255 CONTRASTS: Performed by: NURSE PRACTITIONER

## 2024-08-21 PROCEDURE — A4648 IMPLANTABLE TISSUE MARKER: HCPCS

## 2024-08-21 PROCEDURE — 2720000007 HC OR 272 NO HCPCS

## 2024-08-21 PROCEDURE — 2500000005 HC RX 250 GENERAL PHARMACY W/O HCPCS: Performed by: RADIOLOGY

## 2024-08-21 PROCEDURE — 2780000003 HC OR 278 NO HCPCS

## 2024-08-21 PROCEDURE — 19085 BX BREAST 1ST LESION MR IMAG: CPT

## 2024-08-21 PROCEDURE — 3008F BODY MASS INDEX DOCD: CPT | Performed by: NURSE PRACTITIONER

## 2024-08-21 PROCEDURE — 77065 DX MAMMO INCL CAD UNI: CPT | Mod: LT

## 2024-08-21 RX ORDER — GADOTERATE MEGLUMINE 376.9 MG/ML
14 INJECTION INTRAVENOUS
Status: COMPLETED | OUTPATIENT
Start: 2024-08-21 | End: 2024-08-21

## 2024-08-21 NOTE — PATIENT INSTRUCTIONS
I recommend a left breast MRI guided biopsy. A breast radiology physician will perform the procedure. Possible diagnoses include benign, atypia or cancer. Bruising and mild discomfort after the biopsy is normal and will improve. I typically have results in 3-5 business days. I will call you with the results, please have your phone handy to take my call. If you receive medical information from [facility] Personal Health Record, it is possible to view or see results of your biopsy or procedure before I contact you directly. I will provide recommendations for future follow up based on your biopsy results.     You can see your health information, review clinical summaries from office visits & test results online when you follow your health with MY  Chart, a personal health record. To sign up go to [email or web address]. If you need assistance with signing up or trouble getting into your account call [facility] Patient Line 24/7 at [phone].    Should you have any questions or concerns after biopsy, please do not hesitate to call my office at [phone]. If it has been more than a week since your biopsy was performed and you have not received results, please call my office at [phone]. Thank you for choosing [facility] and trusting me as your healthcare provider. I am honored to be a provider on your health care team and I remain dedicated to helping you achieve your health goals.   Patient needs a refill on sitaGLIPtin-metFORMIN (JANUMET XR)  mg TM24  &  atorvastatin (LIPITOR) 20 mg tablet sent to [facility]).

## 2024-08-26 ENCOUNTER — TELEPHONE (OUTPATIENT)
Dept: SURGERY | Facility: HOSPITAL | Age: 54
End: 2024-08-26
Payer: COMMERCIAL

## 2024-08-26 LAB
LABORATORY COMMENT REPORT: NORMAL
PATH REPORT.FINAL DX SPEC: NORMAL
PATH REPORT.GROSS SPEC: NORMAL
PATH REPORT.RELEVANT HX SPEC: NORMAL
PATH REPORT.TOTAL CANCER: NORMAL

## 2024-08-26 NOTE — TELEPHONE ENCOUNTER
Spoke with Lashell regarding left breast biopsy results, benign. Awaiting concordance report, patient aware. If concordant, return to [facility] at time of annual mammogram.

## 2024-12-16 ENCOUNTER — HOSPITAL ENCOUNTER (OUTPATIENT)
Dept: RADIOLOGY | Facility: HOSPITAL | Age: 54
Discharge: HOME | End: 2024-12-16
Payer: COMMERCIAL

## 2024-12-16 DIAGNOSIS — M79.641 PAIN OF RIGHT HAND: ICD-10-CM

## 2024-12-16 DIAGNOSIS — M79.641 PAIN OF RIGHT HAND: Primary | ICD-10-CM

## 2024-12-16 PROCEDURE — 73130 X-RAY EXAM OF HAND: CPT | Mod: RT

## 2024-12-16 PROCEDURE — 73130 X-RAY EXAM OF HAND: CPT | Mod: RIGHT SIDE | Performed by: RADIOLOGY

## 2025-01-15 ENCOUNTER — APPOINTMENT (OUTPATIENT)
Dept: PRIMARY CARE | Facility: CLINIC | Age: 55
End: 2025-01-15
Payer: COMMERCIAL

## 2025-01-23 ENCOUNTER — APPOINTMENT (OUTPATIENT)
Dept: PRIMARY CARE | Facility: CLINIC | Age: 55
End: 2025-01-23
Payer: COMMERCIAL

## 2025-01-23 VITALS
BODY MASS INDEX: 30.96 KG/M2 | SYSTOLIC BLOOD PRESSURE: 136 MMHG | HEIGHT: 61 IN | DIASTOLIC BLOOD PRESSURE: 88 MMHG | WEIGHT: 164 LBS

## 2025-01-23 DIAGNOSIS — Z13.220 SCREENING CHOLESTEROL LEVEL: ICD-10-CM

## 2025-01-23 DIAGNOSIS — Z13.29 THYROID DISORDER SCREENING: ICD-10-CM

## 2025-01-23 DIAGNOSIS — Z00.00 HEALTH CARE MAINTENANCE: ICD-10-CM

## 2025-01-23 DIAGNOSIS — Z00.00 WELL ADULT EXAM: Primary | ICD-10-CM

## 2025-01-23 DIAGNOSIS — Z12.11 COLON CANCER SCREENING: ICD-10-CM

## 2025-01-23 DIAGNOSIS — E55.9 VITAMIN D DEFICIENCY: ICD-10-CM

## 2025-01-23 PROCEDURE — 90471 IMMUNIZATION ADMIN: CPT | Performed by: STUDENT IN AN ORGANIZED HEALTH CARE EDUCATION/TRAINING PROGRAM

## 2025-01-23 PROCEDURE — 99396 PREV VISIT EST AGE 40-64: CPT | Performed by: STUDENT IN AN ORGANIZED HEALTH CARE EDUCATION/TRAINING PROGRAM

## 2025-01-23 PROCEDURE — 3008F BODY MASS INDEX DOCD: CPT | Performed by: STUDENT IN AN ORGANIZED HEALTH CARE EDUCATION/TRAINING PROGRAM

## 2025-01-23 PROCEDURE — 90715 TDAP VACCINE 7 YRS/> IM: CPT | Performed by: STUDENT IN AN ORGANIZED HEALTH CARE EDUCATION/TRAINING PROGRAM

## 2025-01-23 PROCEDURE — 1036F TOBACCO NON-USER: CPT | Performed by: STUDENT IN AN ORGANIZED HEALTH CARE EDUCATION/TRAINING PROGRAM

## 2025-01-23 NOTE — PROGRESS NOTES
Subjective   Patient ID: Lashell Bales is a 54 y.o. female who presents for Establish Care.  HPI  Lashell is here to establish care.    She is feeling well overall. No specific complaint or concern at this time, just would like to catch up on preventative/wellness items. She has been checking her BP at work, regularly seeing well controlled readings. She has intermittent loose stools, eats a very balanced diet, plenty of fruits and vegetables. Avoid processed foods high in fat. Drinks plenty of water throughout the day.     PMHx: Left breast cyst, ICH s/p  shunt with shunt rejection  SurgHx: , lasik, hand surgery, ACL repair, breast biopsy,  shunt  FamHx: breast CA - multiple family members  SocialHx: Never smoker. Never vaped. Drinking rare social EtOH. No drug use. Lives at home with son. Other daughter is in PA school in [place]. 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    Review of Systems  12-point ROS was reviewed and is negative, unless otherwise noted in HPI    Objective   Vitals:    25 1017   BP: 136/88      Physical Exam  GEN: alert, conversant, NAD  HEENT: PERRL, EOMI, MMM, Tms pearly gray bilaterally  NECK: supple, no LAD appreciated  CHEST: CTAB  CV: S1, S2, RRR, no murmurs appreciated  ABD: soft, NT, ND  EXT: no significant LE edema  SKIN: warm, dry    Assessment/Plan   #well adult  - Counseled continued efforts on healthy lifestyle modification including balanced diet, and continued exercise for >5 minutes  - counseled age appropriate vaccines and preventative measures    #ICH  Hx of  shunt and shunt rejection  No current issues  - No longer following with Neuro team    #High risk family hx breast CA  - following with high risk breast team for regular surveillance    #IBS-D, suspected  Largely eating a low FODMAP diet    Health Maintenance:  Vaccines: COVID (x3), Flu (UTD), TDAP (update today), Shingles (advised)  Screening: Colonoscopy (ordered today), Mammogram (UTD, following  with breast team), Paptest (referral to GYN)  Labs: Obtain today     RTC in 12 months, or sooner PRN    Isaac Berman, DO